# Patient Record
Sex: MALE | Race: WHITE | Employment: FULL TIME | URBAN - NONMETROPOLITAN AREA
[De-identification: names, ages, dates, MRNs, and addresses within clinical notes are randomized per-mention and may not be internally consistent; named-entity substitution may affect disease eponyms.]

---

## 2021-12-28 LAB
ALBUMIN SERPL-MCNC: 4.7 G/DL
ALP BLD-CCNC: 88 U/L
ALT SERPL-CCNC: 26 U/L
ANION GAP SERPL CALCULATED.3IONS-SCNC: 10 MMOL/L
AST SERPL-CCNC: 19 U/L
BASOPHILS ABSOLUTE: 0.1 /ΜL
BASOPHILS RELATIVE PERCENT: 0.6 %
BILIRUB SERPL-MCNC: 0.8 MG/DL (ref 0.1–1.4)
BUN BLDV-MCNC: 15 MG/DL
CALCIUM SERPL-MCNC: 9.6 MG/DL
CHLORIDE BLD-SCNC: 102 MMOL/L
CHOLESTEROL, TOTAL: 177 MG/DL
CHOLESTEROL/HDL RATIO: NORMAL
CO2: 30 MMOL/L
CREAT SERPL-MCNC: 0.9 MG/DL
EOSINOPHILS ABSOLUTE: 0.2 /ΜL
EOSINOPHILS RELATIVE PERCENT: 2.1 %
GFR CALCULATED: NORMAL
GLUCOSE BLD-MCNC: 78 MG/DL
HCT VFR BLD CALC: 45.7 % (ref 41–53)
HDLC SERPL-MCNC: 43 MG/DL (ref 35–70)
HEMOGLOBIN: 15.9 G/DL (ref 13.5–17.5)
LDL CHOLESTEROL CALCULATED: 81 MG/DL (ref 0–160)
LYMPHOCYTES ABSOLUTE: 3 /ΜL
LYMPHOCYTES RELATIVE PERCENT: 31.9 %
MCH RBC QN AUTO: 31.8 PG
MCHC RBC AUTO-ENTMCNC: 34.8 G/DL
MCV RBC AUTO: 91.1 FL
MONOCYTES ABSOLUTE: 0.6 /ΜL
MONOCYTES RELATIVE PERCENT: 6.2 %
NEUTROPHILS ABSOLUTE: 5.6 /ΜL
NEUTROPHILS RELATIVE PERCENT: 59.2 %
NONHDLC SERPL-MCNC: NORMAL MG/DL
PDW BLD-RTO: 13.7 %
PLATELET # BLD: 278 K/ΜL
PMV BLD AUTO: 8.6 FL
POTASSIUM SERPL-SCNC: 3.9 MMOL/L
PROSTATE SPECIFIC ANTIGEN FREE: 0.72 NG/ML
PROSTATE SPECIFIC ANTIGEN PERCENT FREE: 13.77 %
PSA-PROSTATE SPECIFIC AG: 5.23
RBC # BLD: 5.01 10^6/ΜL
SODIUM BLD-SCNC: 138 MMOL/L
TOTAL PROTEIN: 7.6
TRIGL SERPL-MCNC: 264 MG/DL
URIC ACID: 5.7
VLDLC SERPL CALC-MCNC: 53 MG/DL
WBC # BLD: 9.5 10^3/ML

## 2022-02-22 LAB — PROSTATE SPECIFIC ANTIGEN: 4.59 NG/ML

## 2022-04-07 ENCOUNTER — OFFICE VISIT (OUTPATIENT)
Dept: UROLOGY | Age: 57
End: 2022-04-07
Payer: COMMERCIAL

## 2022-04-07 VITALS
HEIGHT: 73 IN | WEIGHT: 248 LBS | SYSTOLIC BLOOD PRESSURE: 118 MMHG | DIASTOLIC BLOOD PRESSURE: 76 MMHG | BODY MASS INDEX: 32.87 KG/M2

## 2022-04-07 DIAGNOSIS — R97.20 ELEVATED PSA: Primary | ICD-10-CM

## 2022-04-07 DIAGNOSIS — N40.1 BPH WITH OBSTRUCTION/LOWER URINARY TRACT SYMPTOMS: ICD-10-CM

## 2022-04-07 DIAGNOSIS — N52.01 ERECTILE DYSFUNCTION DUE TO ARTERIAL INSUFFICIENCY: ICD-10-CM

## 2022-04-07 DIAGNOSIS — N13.8 BPH WITH OBSTRUCTION/LOWER URINARY TRACT SYMPTOMS: ICD-10-CM

## 2022-04-07 LAB
PROSTATE SPECIFIC ANTIGEN FREE: 0.59 NG/ML
PROSTATE SPECIFIC ANTIGEN PERCENT FREE: 13.95 %
PROSTATE SPECIFIC ANTIGEN: 4.23 NG/ML

## 2022-04-07 PROCEDURE — 99203 OFFICE O/P NEW LOW 30 MIN: CPT | Performed by: UROLOGY

## 2022-04-07 RX ORDER — ROSUVASTATIN CALCIUM 10 MG/1
10 TABLET, COATED ORAL DAILY
COMMUNITY
Start: 2021-12-28

## 2022-04-07 RX ORDER — ESCITALOPRAM OXALATE 20 MG/1
20 TABLET ORAL DAILY
COMMUNITY
Start: 2022-03-05

## 2022-04-07 RX ORDER — LEVOCETIRIZINE DIHYDROCHLORIDE 5 MG/1
5 TABLET, FILM COATED ORAL NIGHTLY
COMMUNITY
Start: 2022-03-04

## 2022-04-07 RX ORDER — COLCHICINE 0.6 MG/1
0.6 TABLET ORAL 2 TIMES DAILY
COMMUNITY
Start: 2022-03-04

## 2022-04-07 RX ORDER — LEVOTHYROXINE SODIUM 0.07 MG/1
75 TABLET ORAL DAILY
COMMUNITY
Start: 2022-03-04

## 2022-04-07 NOTE — PATIENT INSTRUCTIONS
Patient Education        Stopping Smoking: Care Instructions  Your Care Instructions     Cigarette smokers crave the nicotine in cigarettes. Giving it up is much harder than simply changing a habit. Your body has to stop craving the nicotine. It is hard to quit, but you can do it. There are many tools that people use to quitsmoking. You may find that combining tools works best for you. There are several steps to quitting. First you get ready to quit. Then you get support to help you. After that, you learn new skills and behaviors to become anonsmoker. For many people, a necessary step is getting and using medicine. Your doctor will help you set up the plan that best meets your needs. You may want to attend a smoking cessation program to help you quit smoking. When you choose a program, look for one that has proven success. Ask your doctor for ideas. You will greatly increase your chances of success if you take medicineas well as get counseling or join a cessation program.  Some of the changes you feel when you first quit tobacco are uncomfortable. Your body will miss the nicotine at first, and you may feel short-tempered and grumpy. You may have trouble sleeping or concentrating. Medicine can help you deal with these symptoms. You may struggle with changing your smoking habits and rituals. The last step is the tricky one: Be prepared for the smoking urge to continue for a time. This is a lot to deal with, but keep at it. You willfeel better. Follow-up care is a key part of your treatment and safety. Be sure to make and go to all appointments, and call your doctor if you are having problems. It's also a good idea to know your test results and keep alist of the medicines you take. How can you care for yourself at home?  Ask your family, friends, and coworkers for support. You have a better chance of quitting if you have help and support.    Join a support group, such as Nicotine Anonymous, for people who are trying to quit smoking.  Consider signing up for a smoking cessation program, such as the American Lung Association's Freedom from Smoking program.   Get text messaging support. Go to the website at www.smokefree. gov to sign up for the [facility] program.   Set a quit date. Pick your date carefully so that it is not right in the middle of a big deadline or stressful time. Once you quit, do not even take a puff. Get rid of all ashtrays and lighters after your last cigarette. Clean your house and your clothes so that they do not smell of smoke.  Learn how to be a nonsmoker. Think about ways you can avoid those things that make you reach for a cigarette. ? Avoid situations that put you at greatest risk for smoking. For some people, it is hard to have a drink with friends without smoking. For others, they might skip a coffee break with coworkers who smoke. ? Change your daily routine. Take a different route to work or eat a meal in a different place.  Cut down on stress. Calm yourself or release tension by doing an activity you enjoy, such as reading a book, taking a hot bath, or gardening.  Talk to your doctor or pharmacist about nicotine replacement therapy, which replaces the nicotine in your body. You still get nicotine but you do not use tobacco. Nicotine replacement products help you slowly reduce the amount of nicotine you need. These products come in several forms, many of them available over-the-counter:  ? Nicotine patches  ? Nicotine gum and lozenges  ? Nicotine inhaler   Ask your doctor about bupropion (Wellbutrin) or varenicline (Chantix), which are prescription medicines. They do not contain nicotine. They help you by reducing withdrawal symptoms, such as stress and anxiety.  Some people find hypnosis, acupuncture, and massage helpful for ending the smoking habit.  Eat a healthy diet and get regular exercise.  Having healthy habits will help your body move past its craving for nicotine.  Be prepared to keep trying. Most people are not successful the first few times they try to quit. Do not get mad at yourself if you smoke again. Make a list of things you learned and think about when you want to try again, such as next week, next month, or next year. Where can you learn more? Go to https://Pla[email or web address].SoStupid.com. org and sign in to your Paybubble account. Enter C426 in the toucanBox box to learn more about \"Stopping Smoking: Care Instructions. \"     If you do not have an account, please click on the \"Sign Up Now\" link. Current as of: October 28, 2021               Content Version: 13.2  © 2006-2022 Healthwise, Incorporated. Care instructions adapted under license by [facility] ([facility]). If you have questions about a medical condition or this instruction, always ask your healthcare professional. [address] any warranty or liability for your use of this information.

## 2022-04-07 NOTE — PROGRESS NOTES
Godwin Romano MD   Urology Clinic office Visit      Patient:  Catherine Grijalva  YOB: 1965  Date: 4/7/2022    HISTORY OF PRESENT ILLNESS:   The patient is a 59 y.o. male who presents today for evaluation of the following problem(s):      1. Elevated PSA    2. BPH with obstruction/lower urinary tract symptoms    3. Erectile dysfunction due to arterial insufficiency           Overall the problem(s) : are worsening. Associated Symptoms: No dysuria, gross hematuria. Pain Severity:      Summary of old records: N/A  (Patient's old records, notes and chart reviewed and summarized above.)      Onset years  Severity is described as mild. The course of symptoms over time is chronic and insidious. Alleviating factors: none  Worsening factors: none  Lower urinary tract symptoms: hesitancy and decreased urinary stream; mild to moderate  No family hx of prostate cancer  No hematuria no UTIs    I independently reviewed and verified the images and reports from:    Patient was never admitted. Last several PSA's:  Lab Results   Component Value Date    PSA 4.59 02/22/2022    PSA 5.23 12/28/2021       Last total testosterone:  No results found for: TESTOSTERONE    Urinalysis today:  No results found for this visit on 04/07/22. Last BUN and creatinine:  Lab Results   Component Value Date    BUN 15 12/28/2021     Lab Results   Component Value Date    CREATININE 0.9 12/28/2021       Imaging Reviewed during this Office Visit:   (results were independently reviewed by physician and radiology report verified)    PAST MEDICAL, FAMILY AND SOCIAL HISTORY:  No past medical history on file. Past Surgical History:   Procedure Laterality Date    HERNIA REPAIR      abdominal and groin    TONSILLECTOMY      WRIST GANGLION EXCISION       No family history on file.   Outpatient Medications Marked as Taking for the 4/7/22 encounter (Office Visit) with Cedrick Valdovinos MD   Medication Sig Dispense Refill    levothyroxine (SYNTHROID) 75 MCG tablet Take 75 mcg by mouth Daily       colchicine (COLCRYS) 0.6 MG tablet Take 0.6 mg by mouth 2 times daily       levocetirizine (XYZAL) 5 MG tablet Take 5 mg by mouth nightly       rosuvastatin (CRESTOR) 10 MG tablet Take 10 mg by mouth daily       escitalopram (LEXAPRO) 20 MG tablet Take 20 mg by mouth daily       Multiple Vitamins-Minerals (MENS 50+ MULTI VITAMIN/MIN) TABS Take 1 tablet by mouth daily         Pcn [penicillins] and Dilantin [phenytoin]  Social History     Tobacco Use   Smoking Status Current Every Day Smoker    Packs/day: 1.00    Years: 43.00    Pack years: 43.00    Types: Cigarettes    Start date: [address] Former User       Social History     Substance and Sexual Activity   Alcohol Use Yes    Comment: OCCASIONALLY       REVIEW OF SYSTEMS:  Constitutional: negative  Eyes: negative  Respiratory: negative  Cardiovascular: negative  Gastrointestinal: negative  Musculoskeletal: negative  Genitourinary: negative except for what is in HPI  Skin: negative   Neurological: negative  Hematological/Lymphatic: negative  Psychological: negative    Physical Exam:    This a 59 y.o. male   Vitals:    04/07/22 0804   BP: 118/76     Constitutional: Patient in no acute distress; Neuro: alert and oriented to person place and time. Psych: Mood and affect normal.  Skin: Normal  Lungs: Respiratory effort normal  Cardiovascular:  Normal peripheral pulses  Abdomen: Soft, non-tender, non-distended   Bladder non-tender and not distended. Lymphatics: no palpable lymphadenopathy  Gait is within normal limits  Musculoskeletal: Normal range of motion       Assessment and Plan      1. Elevated PSA    2. BPH with obstruction/lower urinary tract symptoms    3. Erectile dysfunction due to arterial insufficiency         Elevated PSA: Discussed implications of elevated PSA. Discussed repeating again or proceeding to biopsy or MRI. Discussed risks of both.  Discussed false positive and false negative risk of the above. Elevated PSA: Discussed implications of elevated PSA. Discussed things like infection, trauma, surgery, benign enlargement or cancercan all alter PSA. Will repeat PSA and %free PSA. Discussed avoiding sexual activity and bike riding 1-2 weeks prior to recheck. Repeat PSA after abstinence and review    Prescriptions Ordered:  No orders of the defined types were placed in this encounter.      Orders Placed:  Orders Placed This Encounter   Procedures    PSA, free     Standing Status:   Future     Standing Expiration Date:   4/7/2023            MD Liset Flores M.D, MD  [facility]

## 2022-04-13 DIAGNOSIS — N40.1 BPH WITH OBSTRUCTION/LOWER URINARY TRACT SYMPTOMS: ICD-10-CM

## 2022-04-13 DIAGNOSIS — R97.20 ELEVATED PSA: Primary | ICD-10-CM

## 2022-04-13 DIAGNOSIS — N13.8 BPH WITH OBSTRUCTION/LOWER URINARY TRACT SYMPTOMS: ICD-10-CM

## 2022-04-28 ENCOUNTER — TELEMEDICINE (OUTPATIENT)
Dept: UROLOGY | Age: 57
End: 2022-04-28
Payer: COMMERCIAL

## 2022-04-28 DIAGNOSIS — N40.1 BPH WITH OBSTRUCTION/LOWER URINARY TRACT SYMPTOMS: ICD-10-CM

## 2022-04-28 DIAGNOSIS — R97.20 ELEVATED PSA: Primary | ICD-10-CM

## 2022-04-28 DIAGNOSIS — N52.01 ERECTILE DYSFUNCTION DUE TO ARTERIAL INSUFFICIENCY: ICD-10-CM

## 2022-04-28 DIAGNOSIS — N13.8 BPH WITH OBSTRUCTION/LOWER URINARY TRACT SYMPTOMS: ICD-10-CM

## 2022-04-28 PROCEDURE — 99213 OFFICE O/P EST LOW 20 MIN: CPT | Performed by: UROLOGY

## 2022-04-28 NOTE — PROGRESS NOTES
Pascale Potter MD   Urology Clinic office Visit      Patient:  Ara Marroquin  YOB: 1965  Date: 4/28/2022    HISTORY OF PRESENT ILLNESS:   The patient is a 59 y.o. male who presents today for evaluation of the following problem(s):      1. Elevated PSA    2. BPH with obstruction/lower urinary tract symptoms    3. Erectile dysfunction due to arterial insufficiency           Overall the problem(s) : are improving  Associated Symptoms: No dysuria, gross hematuria. Pain Severity:      Summary of old records: N/A  (Patient's old records, notes and chart reviewed and summarized above.)      Onset years  Severity is described as mild. The course of symptoms over time is chronic and insidious. Alleviating factors: none  Worsening factors: none  Lower urinary tract symptoms: hesitancy and decreased urinary stream; mild to moderate  No family hx of prostate cancer   No hematuria no UTIs  No new symptoms      Last several PSA's:  Lab Results   Component Value Date    PSA 4.23 04/07/2022    PSA 4.59 02/22/2022    PSA 5.23 12/28/2021       Last total testosterone:  No results found for: TESTOSTERONE    Urinalysis today:  No results found for this visit on 04/28/22. Last BUN and creatinine:  Lab Results   Component Value Date    BUN 15 12/28/2021     Lab Results   Component Value Date    CREATININE 0.9 12/28/2021       Imaging Reviewed during this Office Visit:   (results were independently reviewed by physician and radiology report verified)    PAST MEDICAL, FAMILY AND SOCIAL HISTORY:  No past medical history on file. Past Surgical History:   Procedure Laterality Date    HERNIA REPAIR      abdominal and groin    TONSILLECTOMY      WRIST GANGLION EXCISION       No family history on file.   No outpatient medications have been marked as taking for the 4/28/22 encounter (Telemedicine) with Serafin Beavers MD.       Pcn [penicillins] and Dilantin [phenytoin]  Social History     Tobacco Use   Smoking Status Current Every Day Smoker    Packs/day: 1.00    Years: 43.00    Pack years: 43.00    Types: Cigarettes    Start date: 1978   Smokeless Tobacco Former User       Social History     Substance and Sexual Activity   Alcohol Use Yes    Comment: OCCASIONALLY       REVIEW OF SYSTEMS:  Constitutional: negative  Eyes: negative  Respiratory: negative  Cardiovascular: negative  Gastrointestinal: negative  Musculoskeletal: negative  Genitourinary: negative except for what is in HPI  Skin: negative   Neurological: negative  Hematological/Lymphatic: negative  Psychological: negative    Physical Exam:    This a 59 y.o. male   There were no vitals filed for this visit. Constitutional: Patient in no acute distress; Neuro: alert and oriented to person place and time. Assessment and Plan      1. Elevated PSA    2. BPH with obstruction/lower urinary tract symptoms    3. Erectile dysfunction due to arterial insufficiency         Elevated PSA:     Repeat PSA after abstinence shows improvement  Will continue to trend for now prior to doing MRI or biopsy  Repeat in 3-4 months, if remains stable can check  q6    Prescriptions Ordered:  No orders of the defined types were placed in this encounter. Orders Placed:  Orders Placed This Encounter   Procedures    PSA, Diagnostic     Standing Status:   Future     Standing Expiration Date:   4/28/2023            MD Cass He M.D, MD  [facility]rology  Patient being evaluated by a Virtual Visit (video visit) encounter to address concerns as mentioned above. A caregiver was present when appropriate. Due to this being a TeleHealth encounter (During KQPLJ-82 public health emergency), evaluation of the following organ systems was limited: Vitals/Constitutional/EENT/Resp/CV/GI//MS/Neuro/Skin/Heme-Lymph-Imm.   Pursuant to the emergency declaration under the [facility], 1135 waiver authority and the Coronavirus Preparedness and Response Supplemental Appropriations Act, this Virtual Visit was conducted with patient's (and/or legal guardian's) consent, to reduce the patient's risk of exposure to COVID-19 and provide necessary medical care. The patient (and/or legal guardian) has also been advised to contact this office for worsening conditions or problems, and seek emergency medical treatment and/or call 911 if deemed necessary. I was located at my home office. Patient was located at home. Services were provided through a video synchronous discussion virtually to substitute for in-person clinic visit. This encounter was initiated by the patient.

## 2022-06-21 LAB — PROSTATE SPECIFIC ANTIGEN: 3.69 NG/ML

## 2022-08-10 ENCOUNTER — TELEMEDICINE (OUTPATIENT)
Dept: UROLOGY | Age: 57
End: 2022-08-10
Payer: COMMERCIAL

## 2022-08-10 DIAGNOSIS — N52.01 ERECTILE DYSFUNCTION DUE TO ARTERIAL INSUFFICIENCY: ICD-10-CM

## 2022-08-10 DIAGNOSIS — N13.8 BPH WITH OBSTRUCTION/LOWER URINARY TRACT SYMPTOMS: ICD-10-CM

## 2022-08-10 DIAGNOSIS — N40.1 BPH WITH OBSTRUCTION/LOWER URINARY TRACT SYMPTOMS: ICD-10-CM

## 2022-08-10 DIAGNOSIS — R97.20 ELEVATED PSA: Primary | ICD-10-CM

## 2022-08-10 PROCEDURE — 99214 OFFICE O/P EST MOD 30 MIN: CPT | Performed by: UROLOGY

## 2022-08-10 NOTE — PROGRESS NOTES
George Henderson MD   Urology Clinic office Visit      Patient:  Gabrielle Crawley  YOB: 1965  Date: 8/10/2022    HISTORY OF PRESENT ILLNESS:   The patient is a 59 y.o. male who presents today for evaluation of the following problem(s):      1. Elevated PSA    2. BPH with obstruction/lower urinary tract symptoms    3. Erectile dysfunction due to arterial insufficiency           Overall the problem(s) : are improving  Associated Symptoms: No dysuria, gross hematuria. Pain Severity:      Summary of old records: N/A  (Patient's old records, notes and chart reviewed and summarized above.)      Onset years  Severity is described as mild. The course of symptoms over time is chronic and insidious. Alleviating factors: none  Worsening factors: none  Lower urinary tract symptoms: hesitancy and decreased urinary stream; mild to moderate  No family hx of prostate cancer   No hematuria no UTIs  No new symptoms      Last several PSA's:  Lab Results   Component Value Date    PSA 3.69 06/21/2022    PSA 4.23 04/07/2022    PSA 4.59 02/22/2022       Last total testosterone:  No results found for: TESTOSTERONE    Urinalysis today:  No results found for this visit on 08/10/22. Last BUN and creatinine:  Lab Results   Component Value Date    BUN 15 12/28/2021     Lab Results   Component Value Date    CREATININE 0.9 12/28/2021       Imaging Reviewed during this Office Visit:   (results were independently reviewed by physician and radiology report verified)    PAST MEDICAL, FAMILY AND SOCIAL HISTORY:  No past medical history on file. Past Surgical History:   Procedure Laterality Date    HERNIA REPAIR      abdominal and groin    TONSILLECTOMY      WRIST GANGLION EXCISION       No family history on file.   Outpatient Medications Marked as Taking for the 8/10/22 encounter (Telemedicine) with Meenakshi Romano MD   Medication Sig Dispense Refill    levothyroxine (SYNTHROID) 75 MCG tablet Take 75 mcg by mouth Daily colchicine (COLCRYS) 0.6 MG tablet Take 0.6 mg by mouth 2 times daily       levocetirizine (XYZAL) 5 MG tablet Take 5 mg by mouth nightly       rosuvastatin (CRESTOR) 10 MG tablet Take 10 mg by mouth daily       escitalopram (LEXAPRO) 20 MG tablet Take 20 mg by mouth daily       Multiple Vitamins-Minerals (MENS 50+ MULTI VITAMIN/MIN) TABS Take 1 tablet by mouth daily         Pcn [penicillins] and Dilantin [phenytoin]  Social History     Tobacco Use   Smoking Status Every Day    Packs/day: 1.00    Years: 43.00    Pack years: 43.00    Types: Cigarettes    Start date: 1   Smokeless Tobacco Former       Social History     Substance and Sexual Activity   Alcohol Use Yes    Comment: OCCASIONALLY       REVIEW OF SYSTEMS:  Constitutional: negative  Eyes: negative  Respiratory: negative  Cardiovascular: negative  Gastrointestinal: negative  Musculoskeletal: negative  Genitourinary: negative except for what is in HPI  Skin: negative   Neurological: negative  Hematological/Lymphatic: negative  Psychological: negative    Physical Exam:    This a 59 y.o. male   There were no vitals filed for this visit. Constitutional: Patient in no acute distress; Neuro: alert and oriented to person place and time. Assessment and Plan      1. Elevated PSA    2. BPH with obstruction/lower urinary tract symptoms    3. Erectile dysfunction due to arterial insufficiency         Elevated PSA:     Repeat PSA after abstinence shows improvement    PSA stable/improved, will  check  q6 months  LUTS/frequency: worsened recently; discussed meds, he would like hold off     Prescriptions Ordered:  No orders of the defined types were placed in this encounter.      Orders Placed:  Orders Placed This Encounter   Procedures    PSA, Diagnostic     Standing Status:   Future     Standing Expiration Date:   8/10/2023              MD Kori Ferraro M.D, MD [facility]  Patient being evaluated by a Virtual Visit (video visit) encounter to address concerns as mentioned above. A caregiver was present when appropriate. Due to this being a TeleHealth encounter (During HCTJK-76 public health emergency), evaluation of the following organ systems was limited: Vitals/Constitutional/EENT/Resp/CV/GI//MS/Neuro/Skin/Heme-Lymph-Imm. Pursuant to the emergency declaration under the [address] [place] [address] and the [employer] and [employer] Act, this Virtual Visit was conducted with patient's (and/or legal guardian's) consent, to reduce the patient's risk of exposure to COVID-19 and provide necessary medical care. The patient (and/or legal guardian) has also been advised to contact this office for worsening conditions or problems, and seek emergency medical treatment and/or call 911 if deemed necessary. I was located at my home office. Patient was located at home. Services were provided through a video synchronous discussion virtually to substitute for in-person clinic visit. This encounter was initiated by the patient.

## 2023-02-06 LAB — PROSTATE SPECIFIC ANTIGEN: 3.9 NG/ML

## 2023-03-08 ENCOUNTER — TELEMEDICINE (OUTPATIENT)
Dept: UROLOGY | Age: 58
End: 2023-03-08
Payer: COMMERCIAL

## 2023-03-08 DIAGNOSIS — R97.20 ELEVATED PSA: ICD-10-CM

## 2023-03-08 DIAGNOSIS — R35.1 NOCTURIA: ICD-10-CM

## 2023-03-08 DIAGNOSIS — N40.1 BPH WITH OBSTRUCTION/LOWER URINARY TRACT SYMPTOMS: Primary | ICD-10-CM

## 2023-03-08 DIAGNOSIS — N52.01 ERECTILE DYSFUNCTION DUE TO ARTERIAL INSUFFICIENCY: ICD-10-CM

## 2023-03-08 DIAGNOSIS — N13.8 BPH WITH OBSTRUCTION/LOWER URINARY TRACT SYMPTOMS: Primary | ICD-10-CM

## 2023-03-08 PROCEDURE — 99214 OFFICE O/P EST MOD 30 MIN: CPT | Performed by: UROLOGY

## 2023-03-08 NOTE — PROGRESS NOTES
Mckinley Bower MD   Urology Clinic office Visit      Patient:  Elma Almanza  YOB: 1965  Date: 3/8/2023    HISTORY OF PRESENT ILLNESS:   The patient is a [age] [facility] y.o. male who presents today for evaluation of the following problem(s):      1. BPH with obstruction/lower urinary tract symptoms    2. Erectile dysfunction due to arterial insufficiency    3. Elevated PSA    4. Nocturia           Overall the problem(s) : are stable  Associated Symptoms: No dysuria, gross hematuria. Pain Severity:      Summary of old records: N/A  (Patient's old records, notes and chart reviewed and summarized above.)      Onset years  Severity is described as mild. The course of symptoms over time is chronic and insidious. Alleviating factors: none  Worsening factors: none  Lower urinary tract symptoms: unchanged hesitancy and decreased urinary stream; mild to moderate  No family hx of prostate cancer   No hematuria no UTIs  No new symptoms      Last several PSA's:  Lab Results   Component Value Date    PSA 3.90 02/06/2023    PSA 3.69 06/21/2022    PSA 4.23 04/07/2022       Last total testosterone:  No results found for: TESTOSTERONE    Urinalysis today:  No results found for this visit on 03/08/23. Last BUN and creatinine:  Lab Results   Component Value Date    BUN 15 12/28/2021     Lab Results   Component Value Date    CREATININE 0.9 12/28/2021       Imaging Reviewed during this Office Visit:   (results were independently reviewed by physician and radiology report verified)    PAST MEDICAL, FAMILY AND SOCIAL HISTORY:  History reviewed. No pertinent past medical history. Past Surgical History:   Procedure Laterality Date    HERNIA REPAIR      abdominal and groin    TONSILLECTOMY      WRIST GANGLION EXCISION       History reviewed. No pertinent family history.   Outpatient Medications Marked as Taking for the 3/8/23 encounter (Telemedicine) with Ayan Tavares MD   Medication Sig Dispense Refill levothyroxine (SYNTHROID) 75 MCG tablet Take 75 mcg by mouth Daily       rosuvastatin (CRESTOR) 10 MG tablet Take 10 mg by mouth daily       escitalopram (LEXAPRO) 20 MG tablet Take 30 mg by mouth daily         Pcn [penicillins] and Dilantin [phenytoin]  Social History     Tobacco Use   Smoking Status Every Day    Packs/day: 1.00    Years: 43.00    Pack years: 43.00    Types: Cigarettes    Start date: 1   Smokeless Tobacco Former       Social History     Substance and Sexual Activity   Alcohol Use Yes    Comment: OCCASIONALLY       REVIEW OF SYSTEMS:  Constitutional: negative  Eyes: negative  Respiratory: negative  Cardiovascular: negative  Gastrointestinal: negative  Musculoskeletal: negative  Genitourinary: negative except for what is in HPI  Skin: negative   Neurological: negative  Hematological/Lymphatic: negative  Psychological: negative    Physical Exam:    This a 59 y.o. male   There were no vitals filed for this visit. Constitutional: Patient in no acute distress; Neuro: alert and oriented to person place and time. Assessment and Plan      1. BPH with obstruction/lower urinary tract symptoms    2. Erectile dysfunction due to arterial insufficiency    3. Elevated PSA    4. Nocturia         Elevated PSA:     Repeat PSA after abstinence shows improvement    PSA stable/improved, 3.9 from 3.69, 4.2; will  check in 6 months, if stable, then will go to annual  LUTS/frequency/nocturia: persistent; discussed meds, he would like hold off     Prescriptions Ordered:  No orders of the defined types were placed in this encounter. Orders Placed:  Orders Placed This Encounter   Procedures    PSA, Diagnostic     Standing Status:   Future     Standing Expiration Date:   3/8/2024                Saurabh Collins M.D, MD    Patient being evaluated by a Virtual Visit (video visit) encounter to address concerns as mentioned above. A caregiver was present when appropriate.  Due to this being a TeleHealth encounter (During SSLKS-78 public health emergency), evaluation of the following organ systems was limited: Vitals/Constitutional/EENT/Resp/CV/GI//MS/Neuro/Skin/Heme-Lymph-Imm. Pursuant to the emergency declaration under the [address] [place] [address] and the [employer] Act, this Virtual Visit was conducted with patient's (and/or legal guardian's) consent, to reduce the patient's risk of exposure to COVID-19 and provide necessary medical care. The patient (and/or legal guardian) has also been advised to contact this office for worsening conditions or problems, and seek emergency medical treatment and/or call 911 if deemed necessary. I was located at my home office. Patient was located at home. Services were provided through a video synchronous discussion virtually to substitute for in-person clinic visit. This encounter was initiated by the patient. Sue Sandoval MD  [facility]  Patient being evaluated by a Virtual Visit (video visit) encounter to address concerns as mentioned above. A caregiver was present when appropriate. Due to this being a TeleHealth encounter (During YKR[facility]-34 [facility] emergency), evaluation of the following organ systems was limited: Vitals/Constitutional/EENT/Resp/CV/GI//MS/Neuro/Skin/Heme-Lymph-Imm. Pursuant to the emergency declaration under the [address] [place] [address] and the [employer] Act, this Virtual Visit was conducted with patient's (and/or legal guardian's) consent, to reduce the patient's risk of exposure to COVID-19 and provide necessary medical care. The patient (and/or legal guardian) has also been advised to contact this office for worsening conditions or problems, and seek emergency medical treatment and/or call 911 if deemed necessary.     I was located at my home office. Patient was located at home. Services were provided through a video synchronous discussion virtually to substitute for in-person clinic visit. This encounter was initiated by the patient.

## 2023-09-07 ENCOUNTER — OFFICE VISIT (OUTPATIENT)
Dept: UROLOGY | Age: 58
End: 2023-09-07
Payer: COMMERCIAL

## 2023-09-07 VITALS
WEIGHT: 248 LBS | BODY MASS INDEX: 32.87 KG/M2 | HEIGHT: 73 IN | DIASTOLIC BLOOD PRESSURE: 62 MMHG | SYSTOLIC BLOOD PRESSURE: 130 MMHG

## 2023-09-07 DIAGNOSIS — N13.8 BPH WITH OBSTRUCTION/LOWER URINARY TRACT SYMPTOMS: ICD-10-CM

## 2023-09-07 DIAGNOSIS — N40.1 BPH WITH OBSTRUCTION/LOWER URINARY TRACT SYMPTOMS: ICD-10-CM

## 2023-09-07 DIAGNOSIS — R35.1 NOCTURIA: Primary | ICD-10-CM

## 2023-09-07 LAB — POST VOID RESIDUAL (PVR): 12 ML

## 2023-09-07 PROCEDURE — 51798 US URINE CAPACITY MEASURE: CPT | Performed by: NURSE PRACTITIONER

## 2023-09-07 PROCEDURE — 99214 OFFICE O/P EST MOD 30 MIN: CPT | Performed by: NURSE PRACTITIONER

## 2023-09-07 RX ORDER — BUPROPION HYDROCHLORIDE 300 MG/1
300 TABLET ORAL EVERY 24 HOURS
COMMUNITY
Start: 2023-08-21

## 2023-09-07 RX ORDER — TAMSULOSIN HYDROCHLORIDE 0.4 MG/1
0.4 CAPSULE ORAL DAILY
COMMUNITY
End: 2023-09-07

## 2023-09-07 RX ORDER — AMLODIPINE BESYLATE 5 MG/1
5 TABLET ORAL DAILY
COMMUNITY
Start: 2023-08-13

## 2023-09-07 RX ORDER — OXYBUTYNIN CHLORIDE 10 MG/1
10 TABLET, EXTENDED RELEASE ORAL NIGHTLY
Qty: 30 TABLET | Refills: 2 | Status: SHIPPED | OUTPATIENT
Start: 2023-09-07

## 2023-09-07 ASSESSMENT — ENCOUNTER SYMPTOMS
VOMITING: 0
NAUSEA: 0
ABDOMINAL PAIN: 0
BACK PAIN: 0

## 2023-09-07 NOTE — PROGRESS NOTES
Thought content normal.     POC  Results for POC orders placed in visit on 09/07/23   poct post void residual   Result Value Ref Range    post void residual 12 ml     Patients recent PSA values are as follows  Lab Results   Component Value Date    PSA 3.90 02/06/2023    PSA 3.69 06/21/2022    PSA 4.23 04/07/2022      Recent BUN/Creatinine:  Lab Results   Component Value Date/Time    BUN 15 12/28/2021 12:00 AM    CREATININE 0.9 12/28/2021 12:00 AM     Assessment:   BPH with LUTs  Elevated PSA  ED  Plan:     Pt reports no improvement in nocturia and LUTS with tamsulosin. Discussed trial of oxybutynin and he is agreeable. Stop tamsulosin and trial oxybutynin 10 mg xl nightly. Script sent to pharmacy. Pt reports he had PSA completed at [facility]. Will obtain results for review and call pt. F/u as scheduled with Dr. Mychal Josue. Call for worsening in symptoms prior.

## 2023-09-08 ENCOUNTER — TELEPHONE (OUTPATIENT)
Dept: UROLOGY | Age: 58
End: 2023-09-08

## 2023-09-08 DIAGNOSIS — R97.20 ELEVATED PSA: Primary | ICD-10-CM

## 2023-09-11 NOTE — TELEPHONE ENCOUNTER
Patient advised of the PSA results and to repeat 1 week prior to next appointment.     He is aware to abstain from sexual activity 1 week prior to repeating PSA

## 2023-10-02 LAB — PROSTATE SPECIFIC ANTIGEN: 5.92 NG/ML

## 2023-10-11 ENCOUNTER — TELEMEDICINE (OUTPATIENT)
Dept: UROLOGY | Age: 58
End: 2023-10-11
Payer: COMMERCIAL

## 2023-10-11 DIAGNOSIS — R97.20 ELEVATED PSA: Primary | ICD-10-CM

## 2023-10-11 DIAGNOSIS — N40.1 BPH WITH OBSTRUCTION/LOWER URINARY TRACT SYMPTOMS: ICD-10-CM

## 2023-10-11 DIAGNOSIS — N32.81 OVERACTIVE BLADDER: ICD-10-CM

## 2023-10-11 DIAGNOSIS — R35.1 NOCTURIA: ICD-10-CM

## 2023-10-11 DIAGNOSIS — N13.8 BPH WITH OBSTRUCTION/LOWER URINARY TRACT SYMPTOMS: ICD-10-CM

## 2023-10-11 PROCEDURE — 99214 OFFICE O/P EST MOD 30 MIN: CPT | Performed by: UROLOGY

## 2023-10-11 RX ORDER — SULFAMETHOXAZOLE AND TRIMETHOPRIM 800; 160 MG/1; MG/1
1 TABLET ORAL 2 TIMES DAILY
Qty: 42 TABLET | Refills: 0 | Status: SHIPPED | OUTPATIENT
Start: 2023-10-11 | End: 2023-11-01

## 2023-10-11 RX ORDER — TOLTERODINE 4 MG/1
4 CAPSULE, EXTENDED RELEASE ORAL DAILY
Qty: 90 CAPSULE | Refills: 3 | Status: SHIPPED | OUTPATIENT
Start: 2023-10-11 | End: 2023-12-10

## 2023-10-11 NOTE — PROGRESS NOTES
Ethan Rodriguez MD   Urology Clinic office Visit      Patient:  Cande Rao  YOB: 1965  Date: 10/11/2023    HISTORY OF PRESENT ILLNESS:   The patient is a 59 y.o. male who presents today for evaluation of the following problem(s):      1. Elevated PSA    2. BPH with obstruction/lower urinary tract symptoms    3. Nocturia    4. Overactive bladder           Overall the problem(s) : are stable  Associated Symptoms: No dysuria, gross hematuria. Pain Severity:      Summary of old records: N/A  (Patient's old records, notes and chart reviewed and summarized above.)      Onset years  Severity is described as mild. The course of symptoms over time is chronic and insidious. Alleviating factors: none  Worsening factors: none  Lower urinary tract symptoms: unchanged hesitancy and decreased urinary stream; mild to moderate  No family hx of prostate cancer   No hematuria no UTIs  No new symptoms      Last several PSA's:  Lab Results   Component Value Date    PSA 5.92 10/02/2023    PSA 6.04 08/21/2023    PSA 3.90 02/06/2023       Last total testosterone:  No results found for: \"TESTOSTERONE\"    Urinalysis today:  No results found for this visit on 10/11/23. Last BUN and creatinine:  Lab Results   Component Value Date    BUN 15 12/28/2021     Lab Results   Component Value Date    CREATININE 0.9 12/28/2021       Imaging Reviewed during this Office Visit:   (results were independently reviewed by physician and radiology report verified)    PAST MEDICAL, FAMILY AND SOCIAL HISTORY:  No past medical history on file. Past Surgical History:   Procedure Laterality Date    HERNIA REPAIR      abdominal and groin    TONSILLECTOMY      WRIST GANGLION EXCISION       No family history on file.   Outpatient Medications Marked as Taking for the 10/11/23 encounter (Telemedicine) with Linda Harvey MD   Medication Sig Dispense Refill    tolterodine (DETROL [place]) 4 MG extended release capsule Take 1 capsule by

## 2023-11-16 ENCOUNTER — OFFICE VISIT (OUTPATIENT)
Dept: UROLOGY | Age: 58
End: 2023-11-16
Payer: COMMERCIAL

## 2023-11-16 VITALS — BODY MASS INDEX: 32.91 KG/M2 | HEIGHT: 73 IN | WEIGHT: 248.3 LBS | RESPIRATION RATE: 18 BRPM

## 2023-11-16 DIAGNOSIS — R35.1 NOCTURIA: ICD-10-CM

## 2023-11-16 DIAGNOSIS — N32.81 OVERACTIVE BLADDER: ICD-10-CM

## 2023-11-16 DIAGNOSIS — N40.1 BPH WITH OBSTRUCTION/LOWER URINARY TRACT SYMPTOMS: ICD-10-CM

## 2023-11-16 DIAGNOSIS — N13.8 BPH WITH OBSTRUCTION/LOWER URINARY TRACT SYMPTOMS: ICD-10-CM

## 2023-11-16 DIAGNOSIS — R97.20 ELEVATED PSA: Primary | ICD-10-CM

## 2023-11-16 DIAGNOSIS — N52.01 ERECTILE DYSFUNCTION DUE TO ARTERIAL INSUFFICIENCY: ICD-10-CM

## 2023-11-16 PROCEDURE — 99214 OFFICE O/P EST MOD 30 MIN: CPT | Performed by: UROLOGY

## 2023-11-16 RX ORDER — SOLIFENACIN SUCCINATE 10 MG/1
10 TABLET, FILM COATED ORAL DAILY
Qty: 30 TABLET | Refills: 2 | Status: SHIPPED | OUTPATIENT
Start: 2023-11-16

## 2023-11-16 NOTE — PROGRESS NOTES
Italia Ramon MD   Urology Clinic office Visit      Patient:  Rufus Bobo  YOB: 1965  Date: 11/16/2023    HISTORY OF PRESENT ILLNESS:   The patient is a 59 y.o. male who presents today for evaluation of the following problem(s):      1. Elevated PSA    2. BPH with obstruction/lower urinary tract symptoms    3. Overactive bladder    4. Erectile dysfunction due to arterial insufficiency    5. Nocturia           Overall the problem(s) : are stable  Associated Symptoms: No dysuria, gross hematuria. Pain Severity:      Summary of old records: N/A  (Patient's old records, notes and chart reviewed and summarized above.)      Onset years  Severity is described as mild. The course of symptoms over time is chronic and insidious. Alleviating factors: none  Worsening factors: none  Lower urinary tract symptoms: unchanged hesitancy and decreased urinary stream; mild to moderate  No family hx of prostate cancer   No hematuria no UTIs  No new symptoms    PSA 7.36 and [facility] 11/2023      Last several PSA's:  Lab Results   Component Value Date    PSA 5.92 10/02/2023    PSA 6.04 08/21/2023    PSA 3.90 02/06/2023       Last total testosterone:  No results found for: \"TESTOSTERONE\"    Urinalysis today:  No results found for this visit on 11/16/23. Last BUN and creatinine:  Lab Results   Component Value Date    BUN 15 12/28/2021     Lab Results   Component Value Date    CREATININE 0.9 12/28/2021       Imaging Reviewed during this Office Visit:   (results were independently reviewed by physician and radiology report verified)    PAST MEDICAL, FAMILY AND SOCIAL HISTORY:  No past medical history on file. Past Surgical History:   Procedure Laterality Date    HERNIA REPAIR      abdominal and groin    TONSILLECTOMY      WRIST GANGLION EXCISION       No family history on file.   Outpatient Medications Marked as Taking for the 11/16/23 encounter (Office Visit) with Sandhya Woodward MD   Medication Sig Dispense

## 2023-11-20 ENCOUNTER — TELEPHONE (OUTPATIENT)
Dept: UROLOGY | Age: 58
End: 2023-11-20

## 2023-11-20 NOTE — TELEPHONE ENCOUNTER
Patient scheduled for MRI PROSTATE W WO  at [facility] on 12/14/23. Arrival of 1 PM for a 130 PM scan time.   Order mailed with instructions or given to the patient in the office

## 2023-12-14 ENCOUNTER — HOSPITAL ENCOUNTER (OUTPATIENT)
Dept: MRI IMAGING | Age: 58
Discharge: HOME | End: 2023-12-14
Attending: UROLOGY
Payer: COMMERCIAL

## 2023-12-14 DIAGNOSIS — N13.8 BPH WITH OBSTRUCTION/LOWER URINARY TRACT SYMPTOMS: ICD-10-CM

## 2023-12-14 DIAGNOSIS — N40.1 BPH WITH OBSTRUCTION/LOWER URINARY TRACT SYMPTOMS: ICD-10-CM

## 2023-12-14 DIAGNOSIS — R97.20 ELEVATED PSA: ICD-10-CM

## 2023-12-14 LAB
CREAT BLD-MCNC: 0.6 MG/DL (ref 0.5–1.2)
GFR SERPL CREATININE-BSD FRML MDRD: > 60 ML/MIN/1.73M2

## 2023-12-14 PROCEDURE — 72197 MRI PELVIS W/O & W/DYE: CPT

## 2023-12-14 PROCEDURE — 82565 ASSAY OF CREATININE: CPT

## 2023-12-14 PROCEDURE — A9579 GAD-BASE MR CONTRAST NOS,1ML: HCPCS | Performed by: UROLOGY

## 2023-12-14 PROCEDURE — 6360000004 HC RX CONTRAST MEDICATION: Performed by: UROLOGY

## 2023-12-14 RX ADMIN — GADOTERIDOL 20 ML: 279.3 INJECTION, SOLUTION INTRAVENOUS at 14:11

## 2023-12-28 DIAGNOSIS — N32.81 OVERACTIVE BLADDER: Primary | ICD-10-CM

## 2024-01-18 ENCOUNTER — PROCEDURE VISIT (OUTPATIENT)
Dept: UROLOGY | Age: 59
End: 2024-01-18

## 2024-01-18 VITALS — RESPIRATION RATE: 16 BRPM | BODY MASS INDEX: 32.07 KG/M2 | HEIGHT: 73 IN | WEIGHT: 242 LBS

## 2024-01-18 DIAGNOSIS — R97.20 ELEVATED PSA: Primary | ICD-10-CM

## 2024-01-18 RX ORDER — GENTAMICIN SULFATE 40 MG/ML
80 INJECTION, SOLUTION INTRAMUSCULAR; INTRAVENOUS ONCE
Status: SHIPPED | OUTPATIENT
Start: 2024-01-18

## 2024-01-18 NOTE — PROGRESS NOTES
Procedure: Trus/Biopsy  Pt name and birth date verified Yes  Patient agrees Dr. Nam is taking biopsies of the prostate Yes  Patient took Enema 2 hours prior to procedure Yes  Patient took Cipro pill(s) of 2 day before procedure, day of, and will the day after Yes  Has patient eaten today?  No  Patient stopped all blood thinners prior to surgery Yes     Patient has given me verbal consent to perform Gentamicin Injection  Yes    Following Dr. Nam plan of care.  Gentamicin 80MG/2ML GIVEN I.M right UOQ HIP  Lot Number: [id number]  Expiration Date: 03/20/2025  NDC #: 30561-090-33    Tobramycin supplied by office.

## 2024-01-18 NOTE — PROGRESS NOTES
Mr. Hernandez was seen in follow up for his prostate biopsy.  The biopsy was indicated and is being performed for elevated PSA and abnormal MRI.  The biopsy is being done with MRI / Ultrasound fusion using the UroNav system.  The biopsy was done without difficulty or complication.    TRUS prostate biopsy note:  After obtaining informed consent, the rectal ultrasound probe was passed per rectum and the prostate visualized.  A local block was performed by instilling 2% lidocaine at the base.  Measurements were taken and the prostate measured 60-70cc    At this point, prostate biopsy was performed.  Using UroNav, the ultrasound and MRI images were fused and then biopsies of the lesions identified by the radiologist were taken.  Three cores were taken from each of the 2 lesions seen on MRI.  Two cores were then  taken at the base, the mid-portion, and the apex of the gland on either side for a total of 12 cores.  The rectal probe was removed and there was minimal bleeding.  Mr. Hernandez tolerated the procedure well and there were no complications.    Prostate size: 70 cc    Cores taken:12 + 5 cores each from 2 lesions making 22 total cores  Complications: none      Assessment:      Prostate biopsy performed without difficulty.  This was done with UroNav MRI fused guidance.        Plan:        I will see Michael back to discuss the pathology in 1-2 weeks.  Further recommendations will be based on these results.

## 2024-02-01 ENCOUNTER — OFFICE VISIT (OUTPATIENT)
Dept: UROLOGY | Age: 59
End: 2024-02-01
Payer: COMMERCIAL

## 2024-02-01 VITALS — BODY MASS INDEX: 32.07 KG/M2 | HEIGHT: 73 IN | WEIGHT: 242 LBS | RESPIRATION RATE: 16 BRPM

## 2024-02-01 DIAGNOSIS — N40.1 BPH WITH OBSTRUCTION/LOWER URINARY TRACT SYMPTOMS: ICD-10-CM

## 2024-02-01 DIAGNOSIS — R35.1 NOCTURIA: ICD-10-CM

## 2024-02-01 DIAGNOSIS — N32.81 OVERACTIVE BLADDER: ICD-10-CM

## 2024-02-01 DIAGNOSIS — N52.01 ERECTILE DYSFUNCTION DUE TO ARTERIAL INSUFFICIENCY: ICD-10-CM

## 2024-02-01 DIAGNOSIS — R97.20 ELEVATED PSA: Primary | ICD-10-CM

## 2024-02-01 DIAGNOSIS — N13.8 BPH WITH OBSTRUCTION/LOWER URINARY TRACT SYMPTOMS: ICD-10-CM

## 2024-02-01 PROCEDURE — 99214 OFFICE O/P EST MOD 30 MIN: CPT | Performed by: UROLOGY

## 2024-02-01 NOTE — PROGRESS NOTES
clinically significant cancer is equivocal) PI-RADS 4: High (clinically significant cancer is likely to be present) PI-RADS-5: Very high (clinically significant cancer is highly likely to be present) **This report has been created using voice recognition software. It may contain minor errors which are inherent in voice recognition technology.** Final report electronically signed by Dr Alison Chavez on 12/15/2023 9:54 AM        Last several PSA's:  Lab Results   Component Value Date    PSA 7.36 11/10/2023    PSA 5.92 10/02/2023    PSA 6.04 08/21/2023       Last total testosterone:  No results found for: \"TESTOSTERONE\"    Urinalysis today:  No results found for this visit on 02/01/24.      Last BUN and creatinine:  Lab Results   Component Value Date    BUN 15 12/28/2021     Lab Results   Component Value Date    CREATININE 0.6 12/14/2023       Imaging Reviewed during this Office Visit:   (results were independently reviewed by physician and radiology report verified)    PAST MEDICAL, FAMILY AND SOCIAL HISTORY:  No past medical history on file.  Past Surgical History:   Procedure Laterality Date    HERNIA REPAIR      abdominal and groin    TONSILLECTOMY      WRIST GANGLION EXCISION       No family history on file.  Outpatient Medications Marked as Taking for the 2/1/24 encounter (Office Visit) with Robb Nam MD   Medication Sig Dispense Refill    mirabegron (MYRBETRIQ) 50 MG TB24 Take 50 mg by mouth daily 14 tablet 0    ciprofloxacin (CIPRO) 500 MG tablet Take 1 tablet by mouth 2 times daily Take 2 tablets the day before prostate biopsy, 2 tablets the day of the biopsy, 2 tablets the day after biopsy. 6 tablet 0    MYRBETRIQ 50 MG TB24 Take 50 mg by mouth daily 30 tablet 11    solifenacin (VESICARE) 10 MG tablet Take 1 tablet by mouth daily 30 tablet 2    metFORMIN (GLUCOPHAGE) 500 MG tablet Take 1 tablet by mouth 2 times daily (with meals)      amLODIPine (NORVASC) 5 MG tablet Take 1 tablet by mouth daily

## 2024-04-18 ENCOUNTER — OFFICE VISIT (OUTPATIENT)
Dept: UROLOGY | Age: 59
End: 2024-04-18
Payer: COMMERCIAL

## 2024-04-18 VITALS
DIASTOLIC BLOOD PRESSURE: 78 MMHG | SYSTOLIC BLOOD PRESSURE: 154 MMHG | WEIGHT: 242 LBS | BODY MASS INDEX: 32.07 KG/M2 | HEIGHT: 73 IN

## 2024-04-18 DIAGNOSIS — N52.01 ERECTILE DYSFUNCTION DUE TO ARTERIAL INSUFFICIENCY: ICD-10-CM

## 2024-04-18 DIAGNOSIS — N32.81 OVERACTIVE BLADDER: ICD-10-CM

## 2024-04-18 DIAGNOSIS — N13.8 BPH WITH OBSTRUCTION/LOWER URINARY TRACT SYMPTOMS: ICD-10-CM

## 2024-04-18 DIAGNOSIS — R97.20 ELEVATED PSA: Primary | ICD-10-CM

## 2024-04-18 DIAGNOSIS — N40.1 BPH WITH OBSTRUCTION/LOWER URINARY TRACT SYMPTOMS: ICD-10-CM

## 2024-04-18 DIAGNOSIS — R35.1 NOCTURIA: ICD-10-CM

## 2024-04-18 PROCEDURE — 99214 OFFICE O/P EST MOD 30 MIN: CPT | Performed by: UROLOGY

## 2024-04-18 NOTE — PROGRESS NOTES
Robb Nam MD   Urology Clinic office Visit      Patient:  Michael Hernandez  YOB: 1965  Date: 4/18/2024    HISTORY OF PRESENT ILLNESS:   The patient is a 59 y.o. male who presents today for evaluation of the following problem(s):      1. Elevated PSA    2. Erectile dysfunction due to arterial insufficiency    3. BPH with obstruction/lower urinary tract symptoms    4. Overactive bladder    5. Nocturia           Overall the problem(s) : are stable  Associated Symptoms: No dysuria, gross hematuria.  Pain Severity:      Summary of old records: N/A  (Patient's old records, notes and chart reviewed and summarized above.)      Onset years  Severity is described as mild.   The course of symptoms over time is chronic and insidious.  Alleviating factors: none  Worsening factors: none  Lower urinary tract symptoms: unchanged hesitancy and decreased urinary stream; mild to moderate  No family hx of prostate cancer   No hematuria no UTIs  No new symptoms    PSA 7.36 and [facility] 11/2023  PSA 6.54 4/2024 at [facility]    I independently reviewed and verified the images and reports from:    MRI PROSTATE W WO CONTRAST    Result Date: 12/15/2023  PROCEDURE: MRI PROSTATE W WO CONTRAST CLINICAL INFORMATION: Elevated PSA, BPH with obstruction/lower urinary tract symptoms, BPH with obstruction/lower urinary tract symptoms COMPARISON: No prior study. TECHNIQUE: Axial T2, coronal T2 and sagittal T2 imaging of the prostate gland. Large field of view axial T2 imaging of the prostate gland. Axial T1, axial T1 VIBE dynamic post vj and axial T1 VIBE dynamic subtracted post vj images through the prostate gland. Diffusion 50, 800, 1400 and ADC maps through the prostate gland. Axial T1 STAR VIBE post vj through the pelvis. 3-D images reconstructed on a separate [employer] Cad workstation with MRI TRUS fusion of prostate mass lesions. CONTRAST: 20  mL of ProHance  intravenously. LABORATORY: 1. PSA on 11/10/2023 was 7.36 ng/ml 2. PSA on

## 2024-10-11 ENCOUNTER — TELEMEDICINE (OUTPATIENT)
Dept: UROLOGY | Age: 59
End: 2024-10-11

## 2024-10-11 DIAGNOSIS — R97.20 ELEVATED PSA: Primary | ICD-10-CM

## 2024-10-11 NOTE — PROGRESS NOTES
This a 59 y.o. male   There were no vitals filed for this visit.    Constitutional: Patient in no acute distress;   Neuro: alert and oriented to person place and time.          Assessment and Plan      1. Elevated PSA           Elevated PSA:     Repeat PSA after abstinence shows improvement    PSA trend 3.9 from 3.69, 4.2; then was 6.04; most recent 5.9  PSA 7.36    LUTS/frequency/nocturia- bothersome: persistent/worsening, recently worse; tried Ditropan  Didn't help  Did not have results with Detrol either  Failed Vesicare  Denies hematuria  May have to do cysto  Tried myrbetriq  Cut fluids 3h before bed  Already took Bactrim x 3 weeks - no change    Rides vehicle for work      MRI  1. A 1.7 x 1.3 cm focal prostatic lesion in the left anterior transitional zone in the base of the prostate is a PI-RADS 5 abnormality. 2. A 1 x 1 cm focal prostatic lesion in the right anterior transitional zone in the apex of the prostate is a PI-RADS 4 abnormality.    1/18/2024  Prostate size: 70 cc  Cores taken:12 + 5 cores each from 2 lesions making 22 total cores  Complications: none  Path negative    PSA 7.36 and [facility] 11/2023  PSA 6.54 4/2024 at [facility]  PSA reviewed as above  PSA trending down    Repeat PSA 6 months          Prescriptions Ordered:  No orders of the defined types were placed in this encounter.       Orders Placed:  Orders Placed This Encounter   Procedures    PSA, Prostatic Specific Antigen     Standing Status:   Future     Standing Expiration Date:   10/11/2025            MD Robb BEGUM MD  [facility]rology  Michael Hernandez, was evaluated through a synchronous (real-time) audio-video encounter. The patient (or guardian if applicable) is aware that this is a billable service, which includes applicable co-pays. This Virtual Visit was conducted with patient's (and/or legal guardian's) consent. Patient identification was verified, and a caregiver was present when appropriate.     I was

## 2025-05-12 DIAGNOSIS — N40.1 BPH WITH OBSTRUCTION/LOWER URINARY TRACT SYMPTOMS: ICD-10-CM

## 2025-05-12 DIAGNOSIS — R97.20 ELEVATED PSA: Primary | ICD-10-CM

## 2025-05-12 DIAGNOSIS — N13.8 BPH WITH OBSTRUCTION/LOWER URINARY TRACT SYMPTOMS: ICD-10-CM
